# Patient Record
Sex: MALE | URBAN - NONMETROPOLITAN AREA
[De-identification: names, ages, dates, MRNs, and addresses within clinical notes are randomized per-mention and may not be internally consistent; named-entity substitution may affect disease eponyms.]

---

## 2024-04-18 ENCOUNTER — APPOINTMENT (OUTPATIENT)
Dept: URBAN - NONMETROPOLITAN AREA CLINIC 39 | Age: 19
Setting detail: DERMATOLOGY
End: 2024-04-19

## 2024-04-18 DIAGNOSIS — B07.8 OTHER VIRAL WARTS: ICD-10-CM

## 2024-04-18 PROCEDURE — OTHER SKIN MEDICINALS: OTHER

## 2024-04-18 PROCEDURE — OTHER MIPS QUALITY: OTHER

## 2024-04-18 PROCEDURE — 99203 OFFICE O/P NEW LOW 30 MIN: CPT

## 2024-04-18 PROCEDURE — OTHER COUNSELING: OTHER

## 2024-04-18 PROCEDURE — OTHER ADDITIONAL NOTES: OTHER

## 2024-04-18 ASSESSMENT — LOCATION SIMPLE DESCRIPTION DERM
LOCATION SIMPLE: LEFT RING FINGER
LOCATION SIMPLE: RIGHT MIDDLE FINGER
LOCATION SIMPLE: RIGHT INDEX FINGERNAIL
LOCATION SIMPLE: LEFT INDEX FINGER
LOCATION SIMPLE: LEFT MIDDLE FINGER

## 2024-04-18 ASSESSMENT — LOCATION DETAILED DESCRIPTION DERM
LOCATION DETAILED: LEFT RING FINGERNAIL
LOCATION DETAILED: LEFT DISTAL DORSAL INDEX FINGER
LOCATION DETAILED: RIGHT MIDDLE DISTAL INTERPHALANGEAL JOINT
LOCATION DETAILED: RIGHT INDEX FINGER LUNULA
LOCATION DETAILED: LEFT DISTAL DORSAL MIDDLE FINGER

## 2024-04-18 ASSESSMENT — LOCATION ZONE DERM
LOCATION ZONE: FINGERNAIL
LOCATION ZONE: FINGER

## 2024-04-18 NOTE — PROCEDURE: ADDITIONAL NOTES
Render Risk Assessment In Note?: no
Detail Level: Simple
Additional Notes: Patient has prescription for oral cimetidine but did not start, recommend to start taking as well as using wart peel from [facility] due to multiple oumou-ungal lesions. \\n\\nWill request records as patient states was put under general anesthesia and had warts treated but did not resolve.

## 2024-04-18 NOTE — PROCEDURE: [facility]
Sig: Take one twice daily
Sig: Apply to the affected skin twice daily
Sig: Use as directed when washing hair
Sig: Apply to affected areas twice daily
Sig: Apply a thin layer to the affected nails daily
Sig: Take one pill daily
Sig: Apply nightly to warts nightly under occlusion
Sig: Apply pea sized amount per area at night
Sig: Apply to affected areas on face twice daily
Wart Solution Prescription 1: 5-Fluorouracil 5%, Salicylic Acid 70% Paste
Sig: Apply a thin layer to the areas with decreased hair density 1-2 times daily
Sig: Take one pill twice daily
Sig: Apply a thin layer to the affected areas daily
Sig: Apply a thin layer to the itching areas twice daily as needed
Sig: Apply twice daily for 5 days
Sig: Apply a thin layer to the affected areas twice daily
Sig: Apply a thin layer to the affected skin twice daily
Sig: Apply twice daily for 5-7 days
Sig: Apply a thin layer to the scar daily
Detail Level: Zone
Sig: Wash affected areas daily.
Product Type (1): Warts
Intro Statement: I recommended the following products: